# Patient Record
Sex: MALE | Employment: FULL TIME | URBAN - METROPOLITAN AREA
[De-identification: names, ages, dates, MRNs, and addresses within clinical notes are randomized per-mention and may not be internally consistent; named-entity substitution may affect disease eponyms.]

---

## 2019-05-24 ENCOUNTER — TRANSFERRED RECORDS (OUTPATIENT)
Dept: HEALTH INFORMATION MANAGEMENT | Facility: CLINIC | Age: 44
End: 2019-05-24

## 2019-12-09 ENCOUNTER — TRANSFERRED RECORDS (OUTPATIENT)
Dept: HEALTH INFORMATION MANAGEMENT | Facility: CLINIC | Age: 44
End: 2019-12-09

## 2019-12-11 ENCOUNTER — MEDICAL CORRESPONDENCE (OUTPATIENT)
Dept: HEALTH INFORMATION MANAGEMENT | Facility: CLINIC | Age: 44
End: 2019-12-11

## 2019-12-12 ENCOUNTER — DOCUMENTATION ONLY (OUTPATIENT)
Dept: VASCULAR SURGERY | Facility: CLINIC | Age: 44
End: 2019-12-12

## 2019-12-12 NOTE — PROGRESS NOTES
Action Terri Wiley 12.12.19 2:15 pm   Action [place] [facility] [place]ri[place] MARQUEZ 12.17.19 3:10 pm   Action Taken [place] [facility] [place]

## 2019-12-31 NOTE — TELEPHONE ENCOUNTER
DIAGNOSIS: consult for Adrenal vein sampling    DATE RECEIVED: 1.15.20   NOTES STATUS DETAILS   OFFICE NOTE from referring provider Care Everywhere 12.11.19 Dr. Alejandra Wiley   OFFICE NOTE from other specialist Care Everywhere    DISCHARGE SUMMARY from hospital N/A    DISCHARGE REPORT from the ER N/A    OPERATIVE REPORT N/A    MEDICATION LIST Care Everywhere    XRAYS (IMAGES & REPORTS) In Pacs 5.24.19   PATHOLOGY  Slides & report N/A

## 2020-01-03 DIAGNOSIS — E26.9 HYPERALDOSTERONISM (H): Primary | ICD-10-CM

## 2020-01-15 ENCOUNTER — PRE VISIT (OUTPATIENT)
Dept: RADIOLOGY | Facility: CLINIC | Age: 45
End: 2020-01-15

## 2025-04-02 ENCOUNTER — OFFICE VISIT (OUTPATIENT)
Dept: UROLOGY | Facility: CLINIC | Age: 50
End: 2025-04-02
Payer: COMMERCIAL

## 2025-04-02 VITALS
BODY MASS INDEX: 40.43 KG/M2 | HEART RATE: 93 BPM | DIASTOLIC BLOOD PRESSURE: 94 MMHG | SYSTOLIC BLOOD PRESSURE: 149 MMHG | WEIGHT: 315 LBS | HEIGHT: 74 IN | OXYGEN SATURATION: 96 %

## 2025-04-02 DIAGNOSIS — N48.6 PEYRONIE'S DISEASE: Primary | ICD-10-CM

## 2025-04-02 PROCEDURE — 3077F SYST BP >= 140 MM HG: CPT | Performed by: STUDENT IN AN ORGANIZED HEALTH CARE EDUCATION/TRAINING PROGRAM

## 2025-04-02 PROCEDURE — 3080F DIAST BP >= 90 MM HG: CPT | Performed by: STUDENT IN AN ORGANIZED HEALTH CARE EDUCATION/TRAINING PROGRAM

## 2025-04-02 PROCEDURE — 99204 OFFICE O/P NEW MOD 45 MIN: CPT | Performed by: STUDENT IN AN ORGANIZED HEALTH CARE EDUCATION/TRAINING PROGRAM

## 2025-04-02 PROCEDURE — 1126F AMNT PAIN NOTED NONE PRSNT: CPT | Performed by: STUDENT IN AN ORGANIZED HEALTH CARE EDUCATION/TRAINING PROGRAM

## 2025-04-02 RX ORDER — DEXTROMETHORPHAN HBR AND GUAIFENESIN 5; 100 MG/5ML; MG/5ML
5 LIQUID ORAL
COMMUNITY
Start: 2017-03-10

## 2025-04-02 RX ORDER — AMLODIPINE BESYLATE 10 MG/1
10 TABLET ORAL
COMMUNITY
Start: 2017-03-24

## 2025-04-02 RX ORDER — TADALAFIL 5 MG/1
5 TABLET ORAL EVERY 24 HOURS
Qty: 90 TABLET | Refills: 3 | Status: SHIPPED | OUTPATIENT
Start: 2025-04-02

## 2025-04-02 RX ORDER — CARVEDILOL 25 MG/1
25 TABLET ORAL
COMMUNITY
Start: 2019-12-11

## 2025-04-02 RX ORDER — LISINOPRIL AND HYDROCHLOROTHIAZIDE 20; 25 MG/1; MG/1
1 TABLET ORAL
COMMUNITY
Start: 2017-03-10

## 2025-04-02 RX ORDER — HYDRALAZINE HYDROCHLORIDE 10 MG/1
10 TABLET, FILM COATED ORAL
COMMUNITY
Start: 2019-12-30

## 2025-04-02 RX ORDER — NEOMYCIN SULFATE, POLYMYXIN B SULFATE AND HYDROCORTISONE 10; 3.5; 1 MG/ML; MG/ML; [USP'U]/ML
5 SUSPENSION/ DROPS AURICULAR (OTIC)
COMMUNITY
Start: 2017-03-24

## 2025-04-02 RX ORDER — VITAMIN B COMPLEX
1000 TABLET ORAL
COMMUNITY
Start: 2019-05-23

## 2025-04-02 ASSESSMENT — PAIN SCALES - GENERAL: PAINLEVEL_OUTOF10: NO PAIN (0)

## 2025-04-02 NOTE — PATIENT INSTRUCTIONS
"The Vacuum device is available through an organization  [employer].  This organization was originally formed by sexual medicine experts to facilitate your treatment of erectile dysfunction.  The device is around $250 and is not reimbursed through your insurance company.    Please call the following number [phone] to order the device and arrange for payment.    You should use the device at least once daily for two minutes total. The best time is after your shower when the body is warm and things are \"stretchier\" .  Apply the vacuum and create a maximum erection then release. You should repeat this 15-20 times, increasing the maximum stretch each time.          You should not  use the constriction ring.    "

## 2025-04-02 NOTE — LETTER
4/2/2025       RE: Coy Storm  [address]  [address]  [facility] [place] [address]     Dear Colleague,    Thank you for referring your patient, Coy Storm, to the [facility] at [facility]. Please see a copy of my visit note below.    Complex Note    Reason for vist:  I am here for Peyronie's disease    HPI  Coy Storm is a 49 year old year old male self-referred for evaluation of Peyronie's disease.    He works as a .     He currently has 15-20 degree dorsal curvature with erection.    He has a hinge effect: No  He has an hourglass deformity: Unilateral, right  He first noticed curvature with erection: 3 months ago  He has pain with erection: No  Active or Stable disease: Active likely  He requires medications to obtain an erection: No  Etiology of his curvature:  Possible injury during sex   He has had loss of penile length since development of Peyronie's: Maybe- slight  Curvature is impacting sexual activity: No  Curvature is resulting patient or partner psychological distress: Somewhat.     He has had prior Peyronie's treatment: None.    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    There are no active problems to display for this patient.      Past Medical History:   Diagnosis Date     NO ACTIVE PROBLEMS (aka NONE)        Past Surgical History:   Procedure Laterality Date     negative history         Current Outpatient Medications   Medication Sig Dispense Refill     amLODIPine (NORVASC) 10 MG tablet Take 10 mg by mouth.       amoxicillin-clavulanate (AUGMENTIN) 875-125 MG tablet        carvedilol (COREG) 25 MG tablet Take 25 mg by mouth.       Dextromethorphan-guaiFENesin (MUCINEX COUGH CHILDRENS) 5-100 MG/5ML LIQD Take 5 mLs by mouth.       hydrALAZINE (APRESOLINE) 10 MG tablet Take 10 mg by mouth.       lisinopril-hydrochlorothiazide  "(ZESTORETIC) 20-25 MG tablet Take 1 tablet by mouth.       neomycin-polymyxin-hydrocortisone (CORTISPORIN) 3.5-16247-5 otic suspension Place 5 drops in ear(s).       vitamin D3 25 mcg (1000 units) tablet Take 1,000 Units by mouth.         No Known Allergies    Social History     Socioeconomic History     Marital status: Single     Spouse name: Not on file     Number of children: Not on file     Years of education: Not on file     Highest education level: Not on file   Occupational History     Not on file   Tobacco Use     Smoking status: Never     Smokeless tobacco: Never   Substance and Sexual Activity     Alcohol use: No     Drug use: No     Sexual activity: Not on file   Other Topics Concern     Not on file   Social History Narrative     Not on file     Social Drivers of Health     Financial Resource Strain: Not on File (3/10/2017)    Received from [employer]    Financial Resource Strain      Financial Resource Strain: 0   Food Insecurity: No Food Insecurity (1/3/2023)    [facility] Vital Sign      Worried About Running Out of Food in the Last Year: Never true      Ran Out of Food in the Last Year: Never true   Transportation Needs: Not on File (2019)    Received from [employer]    Transportation Needs      Transportation: 0   Physical Activity: Not on File (3/10/2017)    Received from [employer]    Physical Activity      Physical Activity: 0   Stress: Not on File (3/10/2017)    Received from [employer]    Stress      Stress: 0   Social Connections: Not on File (3/10/2017)    Received from [employer]    Social Connections      Connectedness: 0   Interpersonal Safety: Not on file   Housing Stability: Not on File (3/10/2017)    Received from [employer]    Housing Stability      Housin       Family History   Problem Relation Age of Onset     Diabetes No family hx of      C.A.D. No family hx of        Review of Systems     BP (!) 149/94   Pulse 93   Ht 1.88 m (6' 2\")   Wt (!) 152.9 kg (337 lb)   SpO2 " 96%   BMI 43.27 kg/m    Constitutional:  well appearing. No acute distress  Head: Atraumatic, normocephalic  Eyes: extra ocular movements intact.  Ears, nose, mouth, throat: moist mucous membranes, normal external condition.  Respiratory: normal chest rise.  No audible wheezes.  Genitourinary: Penis is circumcised with an orthotopic meatus.  Stretched penile length: 10 cm  There is a 1 cm x 1cm palpable plaque on the dorsal midline base of the penis- not calcified. No evidence of urethral discharge.  No palpable penile plaques or penile masses.  The scrotum is without masses.  Both testicles are descended and without appreciable mass or tenderness.  There are no appreciable urethral or perineal masses. No evidence of hydrocele or hernia.  Muskuloskeletal: Normal range of motion.  No evidence of clubbing or cyanosis.  Neurologic: Alert and oriented x 3.  Normal gait.  Skin: No rashes or lesions.  Psychiatric: Normal mood and affect    Assessment:  Peyronie's Disease    Plan:  Peyronie's disease    The patient and I had a long discussion about the different options for his Peyronie disease.    These include the followin.  Observation.  We discussed the likelihood of progression.  Given that his curvature has stabilized, I would quote him a relatively low risk of progression.  Overall, it is thought that a third of patients get better, a third stay the same and a third get worse.  My experience is fewer get better and more get worse or stay the same.  For active disease I recommend Nonsteroidal antiinflammatory medications.    2.  The next option is a penile traction device such as Restorex.  This is a mechanical traction device with limited randomized trial data to support its use.  On average mean achieved a 17 degree and 28% reduction in curvature after 3 months of use.  This can be combined with xiaflex as well with potentially greater improvements compared to either treatment alone.    3.  Another option is  Xiaflex.  This is the only FDA approved drug for Peyronie's disease.  This is intralesional therapy with side effects including local irritation, bruising, swelling and a 1% chance of corporeal rupture.  Phase 3 studies have demonstrated an approximate 30% curvature improvement (about 20 degrees).  Patient's must have at least a documented 30  curvature to qualify. Like all invasive penile treatment, there is the possibility of long-term impact on erectile function and sensation, although rare.      4.  Surgical options:    For moderate curvature less than 90 degrees, without significant hourglass narrowing, and good erectile function, penile plication offers a simple, safe approach.  The principle involves placing sutures in the side opposite the curvature.  These plication sutures in the tunica albuginea slightly shorten the tunica albuginea to normal or long side of the penis.  The chance of correcting the curvature to within 15 degrees of normal is approximately 90%.  There can be slight shortening of the penis and 85% will be less than 1.5 cm. A permanent altering of penile sensation may occur in up to 10% patients. The risks a relatively limited including a small risk of infection, bruising and numbness. The risk of erectile dysfunction is between 2.5% and 21% in published series, and in my experience, with psychogenic sexual dysfunction occuring in a subset of men due to the significant psychological factors and effects on interpersonal relationships.  There is a very small subset of men who may have discomfort at the suture site requiring removal of the sutures.    The alternative approach is incision of the plaque and grafting.  This is a more extensive procedure which involves immobilization of the neurovascular bundle dorsally, or corpus spongiosum ventrally, an incision through the tunica albuginea at the point of scarring, and placement of a graft in to substitute for the tunica albuginea.  In my hands,  I use a Tutoplast graft to patch the tunica albuginea.  This operation takes approximately 5 hours to complete, and has a considerably longer penile rehabilitation period.  It may take up to 2-3 months to fully regain sexual function.  There are also significant risks of erectile dysfunction with this approach.  Penile length can vary from 1 cm of shortening to 2 cm of gain.  Approximately 12% to 15% of patients will have complete erectile dysfunction after grafting and require a penile implant.  Another 35% of patient's will need use erectile aid such as Viagra or penile injections.  The remaining 50% will then have normal erections.  There is also risk of numbness when we are immobilizing the dorsal neurovascular bundle, approximately 15%.  In my experience, most of these resolve (5% permanent), but may take 3 to 6 months for resolution to occur.  90% of patients have a straight penis, but in the few residual patients with persistent curvature, plications may be necessary. Despite being a tunica lengthening procedure, penile shortening can still occur.    With both surgical procedures a degloving incision on the penis is often used which involves a circumferential incision proximal to the coronal sulcus with absorbable sutures.    For patients unable to obtain an erection without or with pharmacologic therapy placement of an inflatable penile implant +/- plication or grafting in order to treat his underlying organic erectile dysfunction and penile curvature is an option.  We then discussed the inflatable penile implants at greater length.  We discussed some of the different features of these including the pump, the reservoir, and the cylinders.  I explained the implantation method through a penoscrotal incision, dilation of the corpora cavernosa, measurement of the penile size, selection of appropriate implant size, placement of the reservoir, and placement of the pump.  We discussed some of the expectations about  the effectiveness of the device.  It should make the penis rigid upon demand, for an indefinite period of time.    The inflated penile implant length will be the length of the stretched flaccid penis.    We discussed some of the risks and benefits including the risk of infection of an implant (1-2% requiring removal of the device and salvage antibiotic treatment with replacement of a new device, the latter being successful 50-85% of time); bleeding (small risk of scrotal hematoma and swelling); mechanical failure (5% at 5 years, significantly greater by 10-15 years); penile shortening (although we will put in as long a device as the penis can accommodate); adjacent organ injury (less than 1% risk of bladder or urethral injury, each of which generally require termination of the implant procedure and return on another visit); as well as the general risks of surgery in an older man including risks of pneumonia, stroke deep venous thrombosis (DVT), and the like. I also discussed patient and partner satisfaction, and I explained the satisfaction rate from clinical trials including patient and partner satisfaction levels in the 75-90% range.    Patient materials for erectile dysfunction can be found at edcure.org    Thus, many options exist for Peyronie disease and choice depends on the severity of curvature, the absence of progressive pain or curvature, erectile function and the length of the penis.  In all cases of progressive disease, I offer nonsteroidal antiinflammatory medications and offer a traction device.   Patients with severe curvature, very significant shortening, or profound hourglass narrowing or destabilizing hinge need grafting procedures or inflatable penile implant or some combination.  All other patients are potentially candidates for penile plication with its lower risk profile.  With the discussion of any type of Peyronie's surgery, a particular cosmetic result cannot be guaranteed.  These isssues  were carefully discussed with the patient.      Given he is still in the active phase of his disease, I would like him to start Tadalafil 5 mg daily to increase penile blood flow in an effort to prevent erectile dysfunction or penile shortening.     I would also encourage him to use a vacuum erection device daily for 10 minutes to prevent further length loss.    - Return to clinic in 3 months for symptom recheck.    I greatly appreciate the opportunity to participate in the care of your patient.  Please feel free to call with any questions or concerns.    Rupinder Treadwell MD  Reconstructive Urology  [facility]      Again, thank you for allowing me to participate in the care of your patient.      Sincerely,    Rupinder Treadwell MD

## 2025-04-02 NOTE — PROGRESS NOTES
Complex Note    Reason for vist:  I am here for Peyronie's disease    HPI  Coy Storm is a 49 year old year old male self-referred for evaluation of Peyronie's disease.    He works as a .     He currently has 15-20 degree dorsal curvature with erection.    He has a hinge effect: No  He has an hourglass deformity: Unilateral, right  He first noticed curvature with erection: 3 months ago  He has pain with erection: No  Active or Stable disease: Active likely  He requires medications to obtain an erection: No  Etiology of his curvature:  Possible injury during sex   He has had loss of penile length since development of Peyronie's: Maybe- slight  Curvature is impacting sexual activity: No  Curvature is resulting patient or partner psychological distress: Somewhat.     He has had prior Peyronie's treatment: None.    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    There are no active problems to display for this patient.      Past Medical History:   Diagnosis Date    NO ACTIVE PROBLEMS (aka NONE)        Past Surgical History:   Procedure Laterality Date    negative history         Current Outpatient Medications   Medication Sig Dispense Refill    amLODIPine (NORVASC) 10 MG tablet Take 10 mg by mouth.      amoxicillin-clavulanate (AUGMENTIN) 875-125 MG tablet       carvedilol (COREG) 25 MG tablet Take 25 mg by mouth.      Dextromethorphan-guaiFENesin (MUCINEX COUGH CHILDRENS) 5-100 MG/5ML LIQD Take 5 mLs by mouth.      hydrALAZINE (APRESOLINE) 10 MG tablet Take 10 mg by mouth.      lisinopril-hydrochlorothiazide (ZESTORETIC) 20-25 MG tablet Take 1 tablet by mouth.      neomycin-polymyxin-hydrocortisone (CORTISPORIN) 3.5-58477-6 otic suspension Place 5 drops in ear(s).      vitamin D3 25 mcg (1000 units) tablet Take 1,000 Units by mouth.         No Known Allergies    Social History     Socioeconomic History    Marital status:  "Single     Spouse name: Not on file    Number of children: Not on file    Years of education: Not on file    Highest education level: Not on file   Occupational History    Not on file   Tobacco Use    Smoking status: Never    Smokeless tobacco: Never   Substance and Sexual Activity    Alcohol use: No    Drug use: No    Sexual activity: Not on file   Other Topics Concern    Not on file   Social History Narrative    Not on file     Social Drivers of Health     Financial Resource Strain: Not on File (3/10/2017)    Received from [facility]    Financial Resource Strain     Financial Resource Strain: 0   Food Insecurity: No Food Insecurity (1/3/2023)    Received from [facility] Vital Sign     Worried About Running Out of Food in the Last Year: Never true     Ran Out of Food in the Last Year: Never true   Transportation Needs: Not on File (2019)    Received from [facility]    Transportation Needs     Transportation: 0   Physical Activity: Not on File (3/10/2017)    Received from [facility]    Physical Activity     Physical Activity: 0   Stress: Not on File (3/10/2017)    Received from [facility]    Stress     Stress: 0   Social Connections: Not on File (3/10/2017)    Received from [facility]    Social Connections     Connectedness: 0   Interpersonal Safety: Not on file   Housing Stability: Not on File (3/10/2017)    Received from [facility]    Housing Stability     [place]ousin       Family History   Problem Relation Age of Onset    Diabetes No family hx of     C.A.D. No family hx of        Review of Systems     BP (!) 149/94   Pulse 93   Ht 1.88 m (6' 2\")   Wt (!) 152.9 kg (337 lb)   SpO2 96%   BMI 43.27 kg/m    Constitutional:  well appearing. No acute distress  Head: Atraumatic, normocephalic  Eyes: extra ocular movements intact.  Ears, nose, mouth, throat: moist mucous membranes, normal external condition.  Respiratory: normal chest rise.  No audible wheezes.  Genitourinary: Penis is circumcised with an orthotopic meatus. "  Stretched penile length: 10 cm  There is a 1 cm x 1cm palpable plaque on the dorsal midline base of the penis- not calcified. No evidence of urethral discharge.  No palpable penile plaques or penile masses.  The scrotum is without masses.  Both testicles are descended and without appreciable mass or tenderness.  There are no appreciable urethral or perineal masses. No evidence of hydrocele or hernia.  Muskuloskeletal: Normal range of motion.  No evidence of clubbing or cyanosis.  Neurologic: Alert and oriented x 3.  Normal gait.  Skin: No rashes or lesions.  Psychiatric: Normal mood and affect    Assessment:  Peyronie's Disease    Plan:  Peyronie's disease    The patient and I had a long discussion about the different options for his Peyronie disease.    These include the followin.  Observation.  We discussed the likelihood of progression.  Given that his curvature has stabilized, I would quote him a relatively low risk of progression.  Overall, it is thought that a third of patients get better, a third stay the same and a third get worse.  My experience is fewer get better and more get worse or stay the same.  For active disease I recommend Nonsteroidal antiinflammatory medications.    2.  The next option is a penile traction device such as Restorex.  This is a mechanical traction device with limited randomized trial data to support its use.  On average mean achieved a 17 degree and 28% reduction in curvature after 3 months of use.  This can be combined with xiaflex as well with potentially greater improvements compared to either treatment alone.    3.  Another option is Xiaflex.  This is the only FDA approved drug for Peyronie's disease.  This is intralesional therapy with side effects including local irritation, bruising, swelling and a 1% chance of corporeal rupture.  Phase 3 studies have demonstrated an approximate 30% curvature improvement (about 20 degrees).  Patient's must have at least a documented  30  curvature to qualify. Like all invasive penile treatment, there is the possibility of long-term impact on erectile function and sensation, although rare.      4.  Surgical options:    For moderate curvature less than 90 degrees, without significant hourglass narrowing, and good erectile function, penile plication offers a simple, safe approach.  The principle involves placing sutures in the side opposite the curvature.  These plication sutures in the tunica albuginea slightly shorten the tunica albuginea to normal or long side of the penis.  The chance of correcting the curvature to within 15 degrees of normal is approximately 90%.  There can be slight shortening of the penis and 85% will be less than 1.5 cm. A permanent altering of penile sensation may occur in up to 10% patients. The risks a relatively limited including a small risk of infection, bruising and numbness. The risk of erectile dysfunction is between 2.5% and 21% in published series, and in my experience, with psychogenic sexual dysfunction occuring in a subset of men due to the significant psychological factors and effects on interpersonal relationships.  There is a very small subset of men who may have discomfort at the suture site requiring removal of the sutures.    The alternative approach is incision of the plaque and grafting.  This is a more extensive procedure which involves immobilization of the neurovascular bundle dorsally, or corpus spongiosum ventrally, an incision through the tunica albuginea at the point of scarring, and placement of a graft in to substitute for the tunica albuginea.  In my hands, I use a Tutoplast graft to patch the tunica albuginea.  This operation takes approximately 5 hours to complete, and has a considerably longer penile rehabilitation period.  It may take up to 2-3 months to fully regain sexual function.  There are also significant risks of erectile dysfunction with this approach.  Penile length can vary from  1 cm of shortening to 2 cm of gain.  Approximately 12% to 15% of patients will have complete erectile dysfunction after grafting and require a penile implant.  Another 35% of patient's will need use erectile aid such as Viagra or penile injections.  The remaining 50% will then have normal erections.  There is also risk of numbness when we are immobilizing the dorsal neurovascular bundle, approximately 15%.  In my experience, most of these resolve (5% permanent), but may take 3 to 6 months for resolution to occur.  90% of patients have a straight penis, but in the few residual patients with persistent curvature, plications may be necessary. Despite being a tunica lengthening procedure, penile shortening can still occur.    With both surgical procedures a degloving incision on the penis is often used which involves a circumferential incision proximal to the coronal sulcus with absorbable sutures.    For patients unable to obtain an erection without or with pharmacologic therapy placement of an inflatable penile implant +/- plication or grafting in order to treat his underlying organic erectile dysfunction and penile curvature is an option.  We then discussed the inflatable penile implants at greater length.  We discussed some of the different features of these including the pump, the reservoir, and the cylinders.  I explained the implantation method through a penoscrotal incision, dilation of the corpora cavernosa, measurement of the penile size, selection of appropriate implant size, placement of the reservoir, and placement of the pump.  We discussed some of the expectations about the effectiveness of the device.  It should make the penis rigid upon demand, for an indefinite period of time.    The inflated penile implant length will be the length of the stretched flaccid penis.    We discussed some of the risks and benefits including the risk of infection of an implant (1-2% requiring removal of the device and  salvage antibiotic treatment with replacement of a new device, the latter being successful 50-85% of time); bleeding (small risk of scrotal hematoma and swelling); mechanical failure (5% at 5 years, significantly greater by 10-15 years); penile shortening (although we will put in as long a device as the penis can accommodate); adjacent organ injury (less than 1% risk of bladder or urethral injury, each of which generally require termination of the implant procedure and return on another visit); as well as the general risks of surgery in an older man including risks of pneumonia, stroke deep venous thrombosis (DVT), and the like. I also discussed patient and partner satisfaction, and I explained the satisfaction rate from clinical trials including patient and partner satisfaction levels in the 75-90% range.    Patient materials for erectile dysfunction can be found at edcure.org    Thus, many options exist for Peyronie disease and choice depends on the severity of curvature, the absence of progressive pain or curvature, erectile function and the length of the penis.  In all cases of progressive disease, I offer nonsteroidal antiinflammatory medications and offer a traction device.   Patients with severe curvature, very significant shortening, or profound hourglass narrowing or destabilizing hinge need grafting procedures or inflatable penile implant or some combination.  All other patients are potentially candidates for penile plication with its lower risk profile.  With the discussion of any type of Peyronie's surgery, a particular cosmetic result cannot be guaranteed.  These isssues were carefully discussed with the patient.      Given he is still in the active phase of his disease, I would like him to start Tadalafil 5 mg daily to increase penile blood flow in an effort to prevent erectile dysfunction or penile shortening.     I would also encourage him to use a vacuum erection device daily for 10 minutes to  prevent further length loss.    - Return to clinic in 3 months for symptom recheck.    I greatly appreciate the opportunity to participate in the care of your patient.  Please feel free to call with any questions or concerns.    Rupinder Treadwell MD  Reconstructive Urology  [facility]

## 2025-05-06 ENCOUNTER — TELEPHONE (OUTPATIENT)
Dept: EMERGENCY MEDICINE | Facility: CLINIC | Age: 50
End: 2025-05-06
Payer: COMMERCIAL

## 2025-05-06 NOTE — TELEPHONE ENCOUNTER
Patient is scheduled for a replacement PAP setup with [facility] in [facility] on 05/13/2025 at 1:30pm. We are located at [address] [place] [address].

## 2025-07-09 ENCOUNTER — OFFICE VISIT (OUTPATIENT)
Dept: UROLOGY | Facility: CLINIC | Age: 50
End: 2025-07-09
Payer: COMMERCIAL

## 2025-07-09 VITALS
BODY MASS INDEX: 40.43 KG/M2 | HEIGHT: 74 IN | WEIGHT: 315 LBS | HEART RATE: 60 BPM | OXYGEN SATURATION: 97 % | SYSTOLIC BLOOD PRESSURE: 139 MMHG | DIASTOLIC BLOOD PRESSURE: 98 MMHG

## 2025-07-09 DIAGNOSIS — N48.6 PEYRONIE'S DISEASE: Primary | ICD-10-CM

## 2025-07-09 PROCEDURE — 3075F SYST BP GE 130 - 139MM HG: CPT | Performed by: STUDENT IN AN ORGANIZED HEALTH CARE EDUCATION/TRAINING PROGRAM

## 2025-07-09 PROCEDURE — 1126F AMNT PAIN NOTED NONE PRSNT: CPT | Performed by: STUDENT IN AN ORGANIZED HEALTH CARE EDUCATION/TRAINING PROGRAM

## 2025-07-09 PROCEDURE — 99213 OFFICE O/P EST LOW 20 MIN: CPT | Performed by: STUDENT IN AN ORGANIZED HEALTH CARE EDUCATION/TRAINING PROGRAM

## 2025-07-09 PROCEDURE — 3080F DIAST BP >= 90 MM HG: CPT | Performed by: STUDENT IN AN ORGANIZED HEALTH CARE EDUCATION/TRAINING PROGRAM

## 2025-07-09 ASSESSMENT — PAIN SCALES - GENERAL: PAINLEVEL_OUTOF10: NO PAIN (0)

## 2025-07-09 NOTE — LETTER
7/9/2025       RE: Coy Storm  [address]  [place] [address]     Dear Colleague,    Thank you for referring your patient, Coy Storm, to the [facility] at [facility]. Please see a copy of my visit note below.    Complex Note    Reason for vist:  I am here for Peyronie's disease    HPI  Coy Storm is a 49 year old year old male self-referred for evaluation of Peyronie's disease.    He works as a .     7/9/2025- He presents today for follow-up. He doesn't notice a significant change in his curvature- maybe slightly worse now. FROYLAN and tadalafil have not made a difference. Not sure if he's ready to commit to intervention. Curvature is not precluding him from sexual activity.    HPI (Clinic visit 4/2/2025)  He currently has 15-20 degree dorsal curvature with erection.    He has a hinge effect: No  He has an hourglass deformity: Unilateral, right  He first noticed curvature with erection: 3 months ago  He has pain with erection: No  Active or Stable disease: Active likely  He requires medications to obtain an erection: No  Etiology of his curvature:  Possible injury during sex   He has had loss of penile length since development of Peyronie's: Maybe- slight  Curvature is impacting sexual activity: No  Curvature is resulting patient or partner psychological distress: Somewhat.     He has had prior Peyronie's treatment: None.    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    There are no active problems to display for this patient.      Past Medical History:   Diagnosis Date     NO ACTIVE PROBLEMS (aka NONE)        Past Surgical History:   Procedure Laterality Date     negative history         Current Outpatient Medications   Medication Sig Dispense Refill     amLODIPine (NORVASC) 10 MG tablet Take 10 mg by mouth.       carvedilol  (COREG) 25 MG tablet Take 25 mg by mouth.       tadalafil (CIALIS) 5 MG tablet Take 1 tablet (5 mg) by mouth every 24 hours. 90 tablet 3     amoxicillin-clavulanate (AUGMENTIN) 875-125 MG tablet        Dextromethorphan-guaiFENesin (MUCINEX COUGH CHILDRENS) 5-100 MG/5ML LIQD Take 5 mLs by mouth.       hydrALAZINE (APRESOLINE) 10 MG tablet Take 10 mg by mouth.       lisinopril-hydrochlorothiazide (ZESTORETIC) 20-25 MG tablet Take 1 tablet by mouth.       neomycin-polymyxin-hydrocortisone (CORTISPORIN) 3.5-20454-9 otic suspension Place 5 drops in ear(s).       vitamin D3 25 mcg (1000 units) tablet Take 1,000 Units by mouth.         No Known Allergies    Social History     Socioeconomic History     Marital status: Single     Spouse name: Not on file     Number of children: Not on file     Years of education: Not on file     Highest education level: Not on file   Occupational History     Not on file   Tobacco Use     Smoking status: Never     Smokeless tobacco: Never   Substance and Sexual Activity     Alcohol use: No     Drug use: No     Sexual activity: Not on file   Other Topics Concern     Not on file   Social History Narrative     Not on file     Social Drivers of Health     Financial Resource Strain: Not on File (3/10/2017)    Received from [facility]    Financial Resource Strain      Financial Resource Strain: 0   Food Insecurity: No Food Insecurity (1/3/2023)    Received from [facility]    Hunger Vital Sign      Worried About Running Out of Food in the Last Year: Never true      Ran Out of Food in the Last Year: Never true   Transportation Needs: Not on File (8/25/2019)    Received from [facility]    Transportation Needs      Transportation: 0   Physical Activity: Not on File (3/10/2017)    Received from [facility]    Physical Activity      Physical Activity: 0   Stress: Not on File (3/10/2017)    Received from [facility]    Stress      Stress: 0   Social Connections: Not on File (3/10/2017)    Received from [facility]    Social  "Connections      Connectedness: 0   Interpersonal Safety: Not on file   Housing Stability: Not on File (3/10/2017)    Received from [employer]    Housing Stability      Housin       Family History   Problem Relation Age of Onset     Diabetes No family hx of      C.A.D. No family hx of        Review of Systems     BP (!) 139/98   Pulse 60   Ht 1.88 m (6' 2\")   Wt (!) 153.3 kg (338 lb)   SpO2 97%   BMI 43.40 kg/m    Constitutional:  well appearing. No acute distress  Head: Atraumatic, normocephalic  Eyes: extra ocular movements intact.  Ears, nose, mouth, throat: moist mucous membranes, normal external condition.  Respiratory: normal chest rise.  No audible wheezes.  Genitourinary (exam from 25 visit): Penis is circumcised with an orthotopic meatus.  Stretched penile length: 10 cm  There is a 1 cm x 1cm palpable plaque on the dorsal midline base of the penis- not calcified. No evidence of urethral discharge.  No palpable penile plaques or penile masses.  The scrotum is without masses.  Both testicles are descended and without appreciable mass or tenderness.  There are no appreciable urethral or perineal masses. No evidence of hydrocele or hernia.  Muskuloskeletal: Normal range of motion.  No evidence of clubbing or cyanosis.  Neurologic: Alert and oriented x 3.  Normal gait.  Skin: No rashes or lesions.  Psychiatric: Normal mood and affect    Assessment:  Peyronie's Disease    Plan:  Peyronie's disease    The patient and I re-discussed options for Peyronie's disease:    These include the followin.  Observation.  We discussed the likelihood of progression.  Given that his curvature has stabilized, I would quote him a relatively low risk of progression.  Overall, it is thought that a third of patients get better, a third stay the same and a third get worse.  My experience is fewer get better and more get worse or stay the same.  For active disease I recommend Nonsteroidal antiinflammatory medications.    2. "  The next option is a penile traction device such as Restorex.  This is a mechanical traction device with limited randomized trial data to support its use.  On average mean achieved a 17 degree and 28% reduction in curvature after 3 months of use.  This can be combined with xiaflex as well with potentially greater improvements compared to either treatment alone.    3.  Another option is Xiaflex.  This is the only FDA approved drug for Peyronie's disease.  This is intralesional therapy with side effects including local irritation, bruising, swelling and a 1% chance of corporeal rupture.  Phase 3 studies have demonstrated an approximate 30% curvature improvement (about 20 degrees).  Patient's must have at least a documented 30  curvature to qualify. Like all invasive penile treatment, there is the possibility of long-term impact on erectile function and sensation, although rare.      4.  Surgical options:  For moderate curvature less than 90 degrees, without significant hourglass narrowing, and good erectile function, penile plication offers a simple, safe approach.  The principle involves placing sutures in the side opposite the curvature.  These plication sutures in the tunica albuginea slightly shorten the tunica albuginea to normal or long side of the penis.  The chance of correcting the curvature to within 15 degrees of normal is approximately 90%.  There can be slight shortening of the penis and 85% will be less than 1.5 cm. A permanent altering of penile sensation may occur in up to 10% patients. The risks a relatively limited including a small risk of infection, bruising and numbness. The risk of erectile dysfunction is between 2.5% and 21% in published series, and in my experience, with psychogenic sexual dysfunction occuring in a subset of men due to the significant psychological factors and effects on interpersonal relationships.  There is a very small subset of men who may have discomfort at the suture  site requiring removal of the sutures.    The alternative approach is incision of the plaque and grafting.  This is a more extensive procedure which involves immobilization of the neurovascular bundle dorsally, or corpus spongiosum ventrally, an incision through the tunica albuginea at the point of scarring, and placement of a graft in to substitute for the tunica albuginea.  In my hands, I use a Tutoplast graft to patch the tunica albuginea.  This operation takes approximately 5 hours to complete, and has a considerably longer penile rehabilitation period.  It may take up to 2-3 months to fully regain sexual function.  There are also significant risks of erectile dysfunction with this approach.  Penile length can vary from 1 cm of shortening to 2 cm of gain.  Approximately 12% to 15% of patients will have complete erectile dysfunction after grafting and require a penile implant.  Another 35% of patient's will need use erectile aid such as Viagra or penile injections.  The remaining 50% will then have normal erections.  There is also risk of numbness when we are immobilizing the dorsal neurovascular bundle, approximately 15%.  In my experience, most of these resolve (5% permanent), but may take 3 to 6 months for resolution to occur.  90% of patients have a straight penis, but in the few residual patients with persistent curvature, plications may be necessary. Despite being a tunica lengthening procedure, penile shortening can still occur.    With both surgical procedures a degloving incision on the penis is often used which involves a circumferential incision proximal to the coronal sulcus with absorbable sutures.    For patients unable to obtain an erection without or with pharmacologic therapy placement of an inflatable penile implant +/- plication or grafting in order to treat his underlying organic erectile dysfunction and penile curvature is an option.  We then discussed the inflatable penile implants at  greater length.  We discussed some of the different features of these including the pump, the reservoir, and the cylinders.  I explained the implantation method through a penoscrotal incision, dilation of the corpora cavernosa, measurement of the penile size, selection of appropriate implant size, placement of the reservoir, and placement of the pump.  We discussed some of the expectations about the effectiveness of the device.  It should make the penis rigid upon demand, for an indefinite period of time.    The inflated penile implant length will be the length of the stretched flaccid penis.    We discussed some of the risks and benefits including the risk of infection of an implant (1-2% requiring removal of the device and salvage antibiotic treatment with replacement of a new device, the latter being successful 50-85% of time); bleeding (small risk of scrotal hematoma and swelling); mechanical failure (5% at 5 years, significantly greater by 10-15 years); penile shortening (although we will put in as long a device as the penis can accommodate); adjacent organ injury (less than 1% risk of bladder or urethral injury, each of which generally require termination of the implant procedure and return on another visit); as well as the general risks of surgery in an older man including risks of pneumonia, stroke deep venous thrombosis (DVT), and the like. I also discussed patient and partner satisfaction, and I explained the satisfaction rate from clinical trials including patient and partner satisfaction levels in the 75-90% range.    Patient materials for erectile dysfunction can be found at edcure.org    Thus, many options exist for Peyronie disease and choice depends on the severity of curvature, the absence of progressive pain or curvature, erectile function and the length of the penis.  In all cases of progressive disease, I offer nonsteroidal antiinflammatory medications and offer a traction device.   Patients with  severe curvature, very significant shortening, or profound hourglass narrowing or destabilizing hinge need grafting procedures or inflatable penile implant or some combination.  All other patients are potentially candidates for penile plication with its lower risk profile.  With the discussion of any type of Peyronie's surgery, a particular cosmetic result cannot be guaranteed.  These isssues were carefully discussed with the patient.      Discussed he would likely not be a candidate for Xiaflex as his curvature is not > 30 degrees. I would like him to send a photo of his erection through the portal to accurately assess his degree of curvature. He would be a candidate for a plication, but he is not certain he is ready to commit to this.    - Continue Tadalafil 5 mg daily  - Continue FROYLAN  - Send photo of erection via the patient portal    He will contact our clinic if he wishes to pursue intervention.     I greatly appreciate the opportunity to participate in the care of your patient.  Please feel free to call with any questions or concerns.    Rupinder Treadwell MD  Reconstructive Urology  [facility]      Again, thank you for allowing me to participate in the care of your patient.      Sincerely,    Rupinder Treadwell MD

## 2025-07-10 NOTE — PROGRESS NOTES
Complex Note    Reason for vist:  I am here for Peyronie's disease    HPI  Coy Storm is a 49 year old year old male self-referred for evaluation of Peyronie's disease.    He works as a .     7/9/2025- He presents today for follow-up. He doesn't notice a significant change in his curvature- maybe slightly worse now. FROYLAN and tadalafil have not made a difference. Not sure if he's ready to commit to intervention. Curvature is not precluding him from sexual activity.    HPI (Clinic visit 4/2/2025)  He currently has 15-20 degree dorsal curvature with erection.    He has a hinge effect: No  He has an hourglass deformity: Unilateral, right  He first noticed curvature with erection: 3 months ago  He has pain with erection: No  Active or Stable disease: Active likely  He requires medications to obtain an erection: No  Etiology of his curvature:  Possible injury during sex   He has had loss of penile length since development of Peyronie's: Maybe- slight  Curvature is impacting sexual activity: No  Curvature is resulting patient or partner psychological distress: Somewhat.     He has had prior Peyronie's treatment: None.    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    There are no active problems to display for this patient.      Past Medical History:   Diagnosis Date    NO ACTIVE PROBLEMS (aka NONE)        Past Surgical History:   Procedure Laterality Date    negative history         Current Outpatient Medications   Medication Sig Dispense Refill    amLODIPine (NORVASC) 10 MG tablet Take 10 mg by mouth.      carvedilol (COREG) 25 MG tablet Take 25 mg by mouth.      tadalafil (CIALIS) 5 MG tablet Take 1 tablet (5 mg) by mouth every 24 hours. 90 tablet 3    amoxicillin-clavulanate (AUGMENTIN) 875-125 MG tablet       Dextromethorphan-guaiFENesin (MUCINEX COUGH CHILDRENS) 5-100 MG/5ML LIQD Take 5 mLs by mouth.      hydrALAZINE  (APRESOLINE) 10 MG tablet Take 10 mg by mouth.      lisinopril-hydrochlorothiazide (ZESTORETIC) 20-25 MG tablet Take 1 tablet by mouth.      neomycin-polymyxin-hydrocortisone (CORTISPORIN) 3.5-48787-3 otic suspension Place 5 drops in ear(s).      vitamin D3 25 mcg (1000 units) tablet Take 1,000 Units by mouth.         No Known Allergies    Social History     Socioeconomic History    Marital status: Single     Spouse name: Not on file    Number of children: Not on file    Years of education: Not on file    Highest education level: Not on file   Occupational History    Not on file   Tobacco Use    Smoking status: Never    Smokeless tobacco: Never   Substance and Sexual Activity    Alcohol use: No    Drug use: No    Sexual activity: Not on file   Other Topics Concern    Not on file   Social History Narrative    Not on file     Social Drivers of Health     Financial Resource Strain: Not on File (3/10/2017)    Received from [facility]    Financial Resource Strain     Financial Resource Strain: 0   Food Insecurity: No Food Insecurity (1/3/2023)    Received from [facility]    Hunger Vital Sign     Worried About Running Out of Food in the Last Year: Never true     Ran Out of Food in the Last Year: Never true   Transportation Needs: Not on File (2019)    Received from [facility]    Transportation Needs     Transportation: 0   Physical Activity: Not on File (3/10/2017)    Received from [facility]    Physical Activity     Physical Activity: 0   Stress: Not on File (3/10/2017)    Received from [facility]    Stress     Stress: 0   Social Connections: Not on File (3/10/2017)    Received from [facility]    Social Connections     Connectedness: 0   Interpersonal Safety: Not on file   Housing Stability: Not on File (3/10/2017)    Received from [facility]    Housing Stability     [place]ousin       Family History   Problem Relation Age of Onset    Diabetes No family hx of     C.A.D. No family hx of        Review of Systems     BP (!) 139/98   Pulse  "60   Ht 1.88 m (6' 2\")   Wt (!) 153.3 kg (338 lb)   SpO2 97%   BMI 43.40 kg/m    Constitutional:  well appearing. No acute distress  Head: Atraumatic, normocephalic  Eyes: extra ocular movements intact.  Ears, nose, mouth, throat: moist mucous membranes, normal external condition.  Respiratory: normal chest rise.  No audible wheezes.  Genitourinary (exam from 25 visit): Penis is circumcised with an orthotopic meatus.  Stretched penile length: 10 cm  There is a 1 cm x 1cm palpable plaque on the dorsal midline base of the penis- not calcified. No evidence of urethral discharge.  No palpable penile plaques or penile masses.  The scrotum is without masses.  Both testicles are descended and without appreciable mass or tenderness.  There are no appreciable urethral or perineal masses. No evidence of hydrocele or hernia.  Muskuloskeletal: Normal range of motion.  No evidence of clubbing or cyanosis.  Neurologic: Alert and oriented x 3.  Normal gait.  Skin: No rashes or lesions.  Psychiatric: Normal mood and affect    Assessment:  Peyronie's Disease    Plan:  Peyronie's disease    The patient and I re-discussed options for Peyronie's disease:    These include the followin.  Observation.  We discussed the likelihood of progression.  Given that his curvature has stabilized, I would quote him a relatively low risk of progression.  Overall, it is thought that a third of patients get better, a third stay the same and a third get worse.  My experience is fewer get better and more get worse or stay the same.  For active disease I recommend Nonsteroidal antiinflammatory medications.    2.  The next option is a penile traction device such as Restorex.  This is a mechanical traction device with limited randomized trial data to support its use.  On average mean achieved a 17 degree and 28% reduction in curvature after 3 months of use.  This can be combined with xiaflex as well with potentially greater improvements " compared to either treatment alone.    3.  Another option is Xiaflex.  This is the only FDA approved drug for Peyronie's disease.  This is intralesional therapy with side effects including local irritation, bruising, swelling and a 1% chance of corporeal rupture.  Phase 3 studies have demonstrated an approximate 30% curvature improvement (about 20 degrees).  Patient's must have at least a documented 30  curvature to qualify. Like all invasive penile treatment, there is the possibility of long-term impact on erectile function and sensation, although rare.      4.  Surgical options:  For moderate curvature less than 90 degrees, without significant hourglass narrowing, and good erectile function, penile plication offers a simple, safe approach.  The principle involves placing sutures in the side opposite the curvature.  These plication sutures in the tunica albuginea slightly shorten the tunica albuginea to normal or long side of the penis.  The chance of correcting the curvature to within 15 degrees of normal is approximately 90%.  There can be slight shortening of the penis and 85% will be less than 1.5 cm. A permanent altering of penile sensation may occur in up to 10% patients. The risks a relatively limited including a small risk of infection, bruising and numbness. The risk of erectile dysfunction is between 2.5% and 21% in published series, and in my experience, with psychogenic sexual dysfunction occuring in a subset of men due to the significant psychological factors and effects on interpersonal relationships.  There is a very small subset of men who may have discomfort at the suture site requiring removal of the sutures.    The alternative approach is incision of the plaque and grafting.  This is a more extensive procedure which involves immobilization of the neurovascular bundle dorsally, or corpus spongiosum ventrally, an incision through the tunica albuginea at the point of scarring, and placement of a  graft in to substitute for the tunica albuginea.  In my hands, I use a Tutoplast graft to patch the tunica albuginea.  This operation takes approximately 5 hours to complete, and has a considerably longer penile rehabilitation period.  It may take up to 2-3 months to fully regain sexual function.  There are also significant risks of erectile dysfunction with this approach.  Penile length can vary from 1 cm of shortening to 2 cm of gain.  Approximately 12% to 15% of patients will have complete erectile dysfunction after grafting and require a penile implant.  Another 35% of patient's will need use erectile aid such as Viagra or penile injections.  The remaining 50% will then have normal erections.  There is also risk of numbness when we are immobilizing the dorsal neurovascular bundle, approximately 15%.  In my experience, most of these resolve (5% permanent), but may take 3 to 6 months for resolution to occur.  90% of patients have a straight penis, but in the few residual patients with persistent curvature, plications may be necessary. Despite being a tunica lengthening procedure, penile shortening can still occur.    With both surgical procedures a degloving incision on the penis is often used which involves a circumferential incision proximal to the coronal sulcus with absorbable sutures.    For patients unable to obtain an erection without or with pharmacologic therapy placement of an inflatable penile implant +/- plication or grafting in order to treat his underlying organic erectile dysfunction and penile curvature is an option.  We then discussed the inflatable penile implants at greater length.  We discussed some of the different features of these including the pump, the reservoir, and the cylinders.  I explained the implantation method through a penoscrotal incision, dilation of the corpora cavernosa, measurement of the penile size, selection of appropriate implant size, placement of the reservoir, and  placement of the pump.  We discussed some of the expectations about the effectiveness of the device.  It should make the penis rigid upon demand, for an indefinite period of time.    The inflated penile implant length will be the length of the stretched flaccid penis.    We discussed some of the risks and benefits including the risk of infection of an implant (1-2% requiring removal of the device and salvage antibiotic treatment with replacement of a new device, the latter being successful 50-85% of time); bleeding (small risk of scrotal hematoma and swelling); mechanical failure (5% at 5 years, significantly greater by 10-15 years); penile shortening (although we will put in as long a device as the penis can accommodate); adjacent organ injury (less than 1% risk of bladder or urethral injury, each of which generally require termination of the implant procedure and return on another visit); as well as the general risks of surgery in an older man including risks of pneumonia, stroke deep venous thrombosis (DVT), and the like. I also discussed patient and partner satisfaction, and I explained the satisfaction rate from clinical trials including patient and partner satisfaction levels in the 75-90% range.    Patient materials for erectile dysfunction can be found at edcure.org    Thus, many options exist for Peyronie disease and choice depends on the severity of curvature, the absence of progressive pain or curvature, erectile function and the length of the penis.  In all cases of progressive disease, I offer nonsteroidal antiinflammatory medications and offer a traction device.   Patients with severe curvature, very significant shortening, or profound hourglass narrowing or destabilizing hinge need grafting procedures or inflatable penile implant or some combination.  All other patients are potentially candidates for penile plication with its lower risk profile.  With the discussion of any type of Peyronie's surgery,  a particular cosmetic result cannot be guaranteed.  These isssues were carefully discussed with the patient.      Discussed he would likely not be a candidate for Xiaflex as his curvature is not > 30 degrees. I would like him to send a photo of his erection through the portal to accurately assess his degree of curvature. He would be a candidate for a plication, but he is not certain he is ready to commit to this.    - Continue Tadalafil 5 mg daily  - Continue FROYLAN  - Send photo of erection via the patient portal    He will contact our clinic if he wishes to pursue intervention.     I greatly appreciate the opportunity to participate in the care of your patient.  Please feel free to call with any questions or concerns.    Rupinder Treadwell MD  Reconstructive Urology  [facility]

## 2025-07-26 ENCOUNTER — HEALTH MAINTENANCE LETTER (OUTPATIENT)
Age: 50
End: 2025-07-26